# Patient Record
Sex: MALE | Race: WHITE
[De-identification: names, ages, dates, MRNs, and addresses within clinical notes are randomized per-mention and may not be internally consistent; named-entity substitution may affect disease eponyms.]

---

## 2019-02-01 ENCOUNTER — HOSPITAL ENCOUNTER (EMERGENCY)
Dept: HOSPITAL 74 - JERFT | Age: 59
Discharge: HOME | End: 2019-02-01
Payer: COMMERCIAL

## 2019-02-01 VITALS — TEMPERATURE: 98 F | SYSTOLIC BLOOD PRESSURE: 105 MMHG | HEART RATE: 80 BPM | DIASTOLIC BLOOD PRESSURE: 64 MMHG

## 2019-02-01 VITALS — BODY MASS INDEX: 29 KG/M2

## 2019-02-01 DIAGNOSIS — J09.X2: Primary | ICD-10-CM

## 2019-02-01 NOTE — PDOC
History of Present Illness





- General


Chief Complaint: Cold Symptoms


Stated Complaint: BODYACHES


Time Seen by Provider: 02/01/19 11:18


History Source: Patient


Exam Limitations: No Limitations





- History of Present Illness


Initial Comments: 





02/01/19 11:40


HISTORY OF PRESENT ILLNESS:  58-year-old male denies medical history presents 

emergency department for evaluation 2 days of body aches, frontal headache, 

nasal congestion, sore throats, generalized weakness and fatigue. Patient 

reports many people around him were sick approximately one and a half to 2 

weeks ago and now he is expressing symptoms. He denies any fevers or chills.





No recent travel or sick contacts. 


PAST MEDICAL HISTORY: Denies past medical history


SURGICAL HISTORY: Denies


ALLERGIES: No known drug allergies





REVIEW OF SYSTEMS


General/Constitutional: +fever. Denies weakness, weight change.


HEENT: Denies change in vision. Denies ear pain or discharge. +sore throat.


Cardiovascular: Denies chest pain or shortness of breath.


Respiratory: Moist productive cough. Denies wheezing, or hemoptysis.


Gastrointestinal: Denies nausea, vomiting, diarrhea or constipation. Denies 

rectal bleeding.


Genitourinary: Denies dysuria, frequency, or change in urination.


Musculoskeletal: +myalgias. Denies neck or back pain.


Skin and breasts: Denies rash or easy bruising.


Neurologic: Denies headache, vertigo, loss of consciousness, or loss of 

sensation.


Psychiatric: Denies depression or anxiety.


Endocrine: Denies increased thirst. Denies abnormal weight change.


Hematologic/Lymphatic: Denies anemia, easy bleeding, or history of blood clots.


Allergic/Immunologic: Denies hives or skin allergy. Denies latex allergy.











PHYSICAL EXAM


General Appearance: Well-appearing, appropriately dressed.  No apparent distress

, no intoxication.


HEENT: EOMI, PERRLA, normal voice, TMs retracted bilaterally.  No conjunctival 

pallor.  No photophobia, scleral icterus. Oropharynx erythematous without 

lesions or exudate. Cobblestoning noted in the posterior. No nasal discharge 

present.


Neck: Supple.  Trachea midline. No tenderness, rigidity, carotid bruit, stridor

, or thyromegaly. Nontender anterior cervical lymphadenopathy present.


Respiratory/Chest: Lungs CTAB.  No shortness of breath, chest tenderness, 

respiratory distress, accessory muscle use. No crackles, rales, rhonchi, stridor

, wheezing, dullness


Cardiovascular: RRR. S1, S2.  No JVD, murmur, bradycardia, tachycardia.


Vascular Pulses: Dorsalis-Pedis (R): 2+, Dorsalis-Pedis (L): 2+


Gastrointestinal/Abdominal: Normal bowel sounds. Abdomen soft, non-distended.  

No tenderness or rebound tenderness. No organomegaly, pulsatile mass, guarding, 

hernia, hepatomegaly, splenomegaly.


Musculoskeletal/Extremities:  Normal inspection. FROM of all extremities, 

normal capillary refill.  Pelvis Stable.  No CVA tenderness. No tenderness to 

extremities, pedal edema, swelling, erythema or deformity.


Integumentary: Appropriate color, dry, warm.  No cyanosis, erythema, jaundice 

or rash


Neurologic: CNs II-XII intact. Fully oriented, alert.  Appropriate mood/affect. 

Motor strength 5/5.  No appreciable EOM palsy, facial droop or sensory deficit.





Past History





- Past Medical History


Allergies/Adverse Reactions: 


 Allergies











Allergy/AdvReac Type Severity Reaction Status Date / Time


 


No Known Allergies Allergy   Verified 02/01/19 11:17











Home Medications: 


Ambulatory Orders





Oseltamivir Phosphate [Tamiflu -] 75 mg PO BID #10 capsule 02/01/19 











- Suicide/Smoking/Psychosocial Hx


Smoking History: Current every day smoker


Number of Cigarettes Smoked Daily: 4


Information on smoking cessation initiated: Yes


'Breaking Loose' booklet given: 12/25/16


Hx Alcohol Use: No


Drug/Substance Use Hx: No


Substance Use Type: None





*Physical Exam





- Vital Signs


 Last Vital Signs











Temp Pulse Resp BP Pulse Ox


 


 98 F   80   18   105/64   97 


 


 02/01/19 11:15  02/01/19 11:15  02/01/19 11:15  02/01/19 11:15  02/01/19 11:15














Moderate Sedation





- Procedure Monitoring


Vital Signs: 


Procedure Monitoring Vital Signs











Temperature  98 F   02/01/19 11:15


 


Pulse Rate  80   02/01/19 11:15


 


Respiratory Rate  18   02/01/19 11:15


 


Blood Pressure  105/64   02/01/19 11:15


 


O2 Sat by Pulse Oximetry (%)  97   02/01/19 11:15











Medical Decision Making





- Medical Decision Making





02/01/19 11:40


A/P:


58-year-old male with 2 days of influenza-like symptoms





Influenza testing


Tylenol 975 mg orally now


Reassess


02/01/19 12:35


Influenza A positive. I will give the patient a prescription for Tamiflu 75 mg 

twice a day for 5 days and I discussed supportive treatment.





I discussed the physical exam findings, ancillary test results and final 

diagnoses with the patient. I answered all of the patient's questions. The 

patient was satisfied with the care received and felt comfortable with the 

discharge plan and treatment plan. The patient will call their primary care 

physician within 24 hours to arrange follow-up and will return to the Emergency 

Department with any new, persistent or worsening symptoms. 





*DC/Admit/Observation/Transfer


Diagnosis at time of Disposition: 


 Influenza A








- Discharge Dispostion


Disposition: HOME


Condition at time of disposition: Stable


Decision to Admit order: No





- Prescriptions


Prescriptions: 


Oseltamivir Phosphate [Tamiflu -] 75 mg PO BID #10 capsule





- Referrals


Referrals: 


Tony Mccormack MD [Primary Care Provider] - 





- Patient Instructions


Printed Discharge Instructions:  DI for Influenza -- Adult


Additional Instructions: 


Rest, drink lots of fluids: Teas, water, soups, Pedialyte


Saltwater gargles


Steamy showers/seem to face break up mucus


Old-fashioned treatments help!


Avoid contact with others until fevers and cough resolved as this is very 

contagious


Lots of handwashing and good hygiene





Continue over-the-counter medications for symptomatic relief


Tylenol or Motrin for fever and pain


Take all of Tamiflu as directed: 1 tab every 12 hours for 5 days





Followup with private physician in one to 2 days as needed or if worsening


Return to emergency department for worsened symptoms, fevers, dehydration


Influenza takes between 5 and 7 days for resolution


To not participate in any activity, work, or school until fevers and cough are 

gone for at least one day





- Post Discharge Activity

## 2020-12-02 ENCOUNTER — HOSPITAL ENCOUNTER (EMERGENCY)
Dept: HOSPITAL 74 - JERFT | Age: 60
Discharge: HOME | End: 2020-12-02
Payer: COMMERCIAL

## 2020-12-02 VITALS — HEART RATE: 66 BPM | DIASTOLIC BLOOD PRESSURE: 81 MMHG | SYSTOLIC BLOOD PRESSURE: 137 MMHG | TEMPERATURE: 98.1 F

## 2020-12-02 VITALS — BODY MASS INDEX: 30.7 KG/M2

## 2020-12-02 DIAGNOSIS — R09.81: Primary | ICD-10-CM

## 2020-12-02 PROCEDURE — U0003 INFECTIOUS AGENT DETECTION BY NUCLEIC ACID (DNA OR RNA); SEVERE ACUTE RESPIRATORY SYNDROME CORONAVIRUS 2 (SARS-COV-2) (CORONAVIRUS DISEASE [COVID-19]), AMPLIFIED PROBE TECHNIQUE, MAKING USE OF HIGH THROUGHPUT TECHNOLOGIES AS DESCRIBED BY CMS-2020-01-R: HCPCS

## 2020-12-02 PROCEDURE — C9803 HOPD COVID-19 SPEC COLLECT: HCPCS
